# Patient Record
Sex: MALE | URBAN - METROPOLITAN AREA
[De-identification: names, ages, dates, MRNs, and addresses within clinical notes are randomized per-mention and may not be internally consistent; named-entity substitution may affect disease eponyms.]

---

## 2019-09-25 ENCOUNTER — APPOINTMENT (RX ONLY)
Dept: URBAN - METROPOLITAN AREA CLINIC 41 | Facility: CLINIC | Age: 39
Setting detail: DERMATOLOGY
End: 2019-09-25

## 2019-09-25 DIAGNOSIS — I83029 VARICOSE VEINS OF LOWER EXTREMITIES WITH ULCER: ICD-10-CM

## 2019-09-25 DIAGNOSIS — L95.0 LIVEDOID VASCULITIS: ICD-10-CM

## 2019-09-25 DIAGNOSIS — I83019 VARICOSE VEINS OF LOWER EXTREMITIES WITH ULCER: ICD-10-CM

## 2019-09-25 DIAGNOSIS — I83009 VARICOSE VEINS OF LOWER EXTREMITIES WITH ULCER: ICD-10-CM

## 2019-09-25 PROBLEM — I83.013 VARICOSE VEINS OF RIGHT LOWER EXTREMITY WITH ULCER OF ANKLE: Status: ACTIVE | Noted: 2019-09-25

## 2019-09-25 PROCEDURE — ? OTHER

## 2019-09-25 PROCEDURE — ? COUNSELING

## 2019-09-25 PROCEDURE — ? PRESCRIPTION

## 2019-09-25 PROCEDURE — 99202 OFFICE O/P NEW SF 15 MIN: CPT

## 2019-09-25 PROCEDURE — ? TREATMENT REGIMEN

## 2019-09-25 RX ORDER — TRIAMCINOLONE ACETONIDE 1 MG/G
CREAM TOPICAL BID
Qty: 1 | Refills: 4 | Status: ERX | COMMUNITY
Start: 2019-09-25

## 2019-09-25 RX ADMIN — TRIAMCINOLONE ACETONIDE: 1 CREAM TOPICAL at 00:00

## 2019-09-25 ASSESSMENT — LOCATION SIMPLE DESCRIPTION DERM
LOCATION SIMPLE: RIGHT PRETIBIAL REGION
LOCATION SIMPLE: RIGHT ANKLE

## 2019-09-25 ASSESSMENT — LOCATION DETAILED DESCRIPTION DERM
LOCATION DETAILED: RIGHT ANKLE
LOCATION DETAILED: RIGHT MEDIAL DISTAL PRETIBIAL REGION

## 2019-09-25 ASSESSMENT — LOCATION ZONE DERM: LOCATION ZONE: LEG

## 2019-09-25 NOTE — PROCEDURE: TREATMENT REGIMEN
Initiate Treatment: Triamcinolone
Detail Level: Detailed
Plan: Recommend the patient to seen a vein specialist at [place], until then instructed the patient to have compression stocking 25-35 strength to work in
Otc Regimen: Vaseline, hydroseal bandage
Offered and patient declined

## 2019-09-25 NOTE — PROCEDURE: OTHER
Other (Free Text): I really feel that his biggest problems is venous insufficiency. These look like a current venous ulcer as well as a couple resolved ones. Patient has a good palpable pulse. Recommended compressions socks and referral to vein clinic.
Note Text (......Xxx Chief Complaint.): This diagnosis correlates with the
Detail Level: Detailed